# Patient Record
Sex: MALE
[De-identification: names, ages, dates, MRNs, and addresses within clinical notes are randomized per-mention and may not be internally consistent; named-entity substitution may affect disease eponyms.]

---

## 2022-07-03 ENCOUNTER — HOSPITAL ENCOUNTER (EMERGENCY)
Dept: HOSPITAL 97 - ER | Age: 37
Discharge: HOME | End: 2022-07-03
Payer: COMMERCIAL

## 2022-07-03 VITALS — OXYGEN SATURATION: 99 %

## 2022-07-03 VITALS — DIASTOLIC BLOOD PRESSURE: 92 MMHG | SYSTOLIC BLOOD PRESSURE: 138 MMHG

## 2022-07-03 VITALS — TEMPERATURE: 98.1 F

## 2022-07-03 DIAGNOSIS — S29.011A: Primary | ICD-10-CM

## 2022-07-03 PROCEDURE — 96372 THER/PROPH/DIAG INJ SC/IM: CPT

## 2022-07-03 PROCEDURE — 99283 EMERGENCY DEPT VISIT LOW MDM: CPT

## 2022-07-03 PROCEDURE — 71100 X-RAY EXAM RIBS UNI 2 VIEWS: CPT

## 2022-07-03 PROCEDURE — 72070 X-RAY EXAM THORAC SPINE 2VWS: CPT

## 2022-07-03 NOTE — RAD REPORT
EXAM DESCRIPTION:  RAD - Thoracic Spine Ap/Lat - 7/3/2022 8:59 pm

 

CLINICAL HISTORY:  PAIN

 

COMPARISON:  No comparisons

 

FINDINGS:  AP & lateral views of the thoracic spine were obtained.

 

Thoracic bodies are normal in height and alignment. There are no acute or destructive bony processes 
seen. No disc space narrowing. Endplate spurring changes are present.

 

No paraspinal masses are identified.

 

 

IMPRESSION:  Negative thoracic spine examination for acute finding.

## 2022-07-03 NOTE — ER
Nurse's Notes                                                                                     

 [facility]                                                                 

Name: Johnny Davila                                                                             

Age: 36 yrs                                                                                       

Sex: Male                                                                                         

: 1985                                                                                   

MRN: [id number]                                                                                   

Arrival Date: 2022                                                                          

Time: 19:26                                                                                       

Account#: [id number]                                                                            

Bed 16                                                                                            

Private MD:                                                                                       

Diagnosis: Strain of muscle and tendon of front wall of thorax                                    

                                                                                                  

Presentation:                                                                                     

                                                                                             

19:38 Chief complaint: Patient states: moving equipment out of trailer and heavy lifting 2    lp1 

      days ago, reports pain to left rib area radiating to abdomen; denies any trauma or          

      falls. Coronavirus screen: At this time, the client does not indicate any symptoms          

      associated with coronavirus-19. Ebola Screen: No symptoms or risks identified at this       

      time. Initial Sepsis Screen: Does the patient meet any 2 criteria? No. Patient's            

      initial sepsis screen is negative. Does the patient have a suspected source of              

      infection? No. Patient's initial sepsis screen is negative. Risk Assessment: Do you         

      want to hurt yourself or someone else? Patient reports no desire to harm self or            

      others. Onset of symptoms was 2022.                                                

19:38 Method Of Arrival: Ambulatory                                                           lp1 

19:38 Acuity: IVÁN 4                                                                           lp1 

                                                                                                  

Triage Assessment:                                                                                

20:25 General: Appears in no apparent distress. uncomfortable, Behavior is appropriate for    ke1 

      age. Pain: Complains of pain in left side ribs area Pain radiates to abdomen Pain           

      currently is 10 out of 10 on a pain scale. at worst was 10 out of 10 on a pain scale.       

      level that patient reports is acceptable is 5 out of 10 on a pain scale. Quality of         

      pain is described as aching, Pain began suddenly, Alleviated by repositioning. Neuro:       

      Level of Consciousness is awake, alert, Oriented to person, place, time, situation.         

      Respiratory: Respiratory effort is even, unlabored, Respiratory pattern is regular,         

      symmetrical. Musculoskeletal: Capillary refill < 3 seconds, Range of motion: intact in      

      all extremities.                                                                            

                                                                                                  

Historical:                                                                                       

- Allergies:                                                                                      

19:40 No Known Allergies;                                                                     lp1 

- Home Meds:                                                                                      

19:40 None [Active];                                                                          lp1 

- PMHx:                                                                                           

19:40 Depression; [place]iwether Dx;                                                              lp1 

- PSHx:                                                                                           

19:40 Carpel Tunnel;                                                                          lp1 

                                                                                                  

- Immunization history:: Adult Immunizations up to date.                                          

- Social history:: Smoking status: Patient denies any tobacco usage or history of.                

                                                                                                  

                                                                                                  

Screenin:25 Abuse screen: Denies threats or abuse. Nutritional screening: No deficits noted.        ke1 

      Tuberculosis screening: No symptoms or risk factors identified. Fall Risk No fall in        

      past 12 months (0 pts). No secondary diagnosis (0 pts). No IV (0 pts). Ambulatory Aid-      

      None/Bed Rest/Nurse Assist (0 pts). Gait- Normal/Bed Rest/Wheelchair (0 pts) Mental         

      Status- Oriented to own ability (0 pts). Total Jorge Fall Scale indicates No Risk (0-24     

      pts).                                                                                       

                                                                                                  

Assessment:                                                                                       

21:16 Reassessment: Patient and/or family updated on plan of care and expected duration. Pain ke1 

      level reassessed. Patient is alert, oriented x 3, equal unlabored respirations, skin        

      warm/dry/pink. Patient denies pain at this time. Patient states feeling better. Patient     

      states symptoms have improved. Neuro: Level of Consciousness is awake, alert, Oriented      

      to person, place, time, situation.                                                          

22:27 Reassessment: Patient and/or family updated on plan of care and expected duration. Pain ke1 

      level reassessed. Patient states feeling better. Patient states symptoms have improved.     

                                                                                                  

Vital Signs:                                                                                      

19:38  / 96; Pulse 74; Resp 18; Temp 98.1(O); Pulse Ox 100% on R/A; Weight 86.18 kg     lp1 

      (R); Height 5 ft. 6 in. (167.64 cm); Pain 10/10;                                            

21:17 Pain 7/10;                                                                              ke1 

21:17 Pain 7/10;                                                                              ke1 

21:19  / 85; Pulse 69; Resp 17; Pulse Ox 99% on R/A; Pain 7/10;                         ke1 

22:27  / 92; Pulse 68; Resp 17; Pulse Ox 99% on R/A;                                    ke1 

19:38 Body Mass Index 30.67 (86.18 kg, 167.64 cm)                                             lp1 

                                                                                                  

ED Course:                                                                                        

19:26 Patient arrived in ED.                                                                  ag3 

19:29 Maximus Blanco NP is PHCP.                                                           pm1 

19:29 Prakash Villalta MD is Attending Physician.                                             pm1 

19:38 Arm band placed on.                                                                     lp1 

19:39 Triage completed.                                                                       lp1 

19:49 Sachin Barclay RN is Primary Nurse.                                                 ke1 

20:27 Bed in low position. Call light in reach.                                               ke1 

21:01 Ribs Left XRAY In Process Unspecified.                                                  EDMS

21:01 XRAY Thoracic Spine (Ap/lat) In Process Unspecified.                                    EDMS

23:16 No provider procedures requiring assistance completed. Patient did not have IV access   ke1 

      during this emergency room visit.                                                           

                                                                                                  

Administered Medications:                                                                         

20:24 Drug: Ketorolac 60 mg Route: IM; Site: left deltoid;                                    ke1 

21:17 Follow up: Pain 7/10 Adult; Response: Pain is decreased                                 ke1 

20:25 Drug: Lidoderm Patch 5 % (700 mg/patch) 1 patches Route: Topical; Site: affected area;  ke1 

21:17 Follow up: Pain 7/10 Adult; Response: Pain is decreased                                 ke1 

                                                                                                  

                                                                                                  

Medication:                                                                                       

23:16 VIS not applicable for this client.                                                     ke1 

                                                                                                  

Outcome:                                                                                          

22:53 Discharge ordered by MD.                                                                pm1 

23:16 Discharged to home ambulatory.                                                          ke1 

23:16 Condition: good                                                                             

23:16 Discharge instructions given to patient.                                                    

23:16 Patient left the ED.                                                                    ke1 

                                                                                                  

Signatures:                                                                                       

Dispatcher [employer]                           EDMS                                                 

Josette Mehta, RN                         RN   lp1                                                  

Maximus Blanco, NP                    NP   pm1                                                  

Dulce Peters                                 ag3                                                  

Sachin Barclay RN                   RN   ke1                                                  

                                                                                                  

**************************************************************************************************

## 2022-07-03 NOTE — EDPHYS
Physician Documentation                                                                           

 [facility]                                                                 

Name: Johnny Davila                                                                             

Age: 36 yrs                                                                                       

Sex: Male                                                                                         

: 1985                                                                                   

MRN: [id number]                                                                                   

Arrival Date: 2022                                                                          

Time: 19:26                                                                                       

Account#: [id number]                                                                            

Bed 16                                                                                            

Private MD:                                                                                       

ED Physician Prakash Villalta                                                                      

HPI:                                                                                              

                                                                                             

20:06 This 36 yrs old Male presents to ER via Ambulatory with complaints of Back Pain.        pm1 

20:06 The patient presents with pain that is acute. The symptoms are located in the Left      pm1 

      lower anterior rib cage.                                                                    

20:06 Onset: The symptoms/episode began/occurred 2 day(s) ago. Associated signs and symptoms: pm1 

      Pertinent negatives: abdominal pain, dysuria, fever, headache, nausea, vomiting. The        

      problem was sustained lifting heavy object and twisting. Modifying factors: The patient     

      symptoms are alleviated by nothing, the patient symptoms are aggravated by bending,         

      movement. Severity of symptoms: in the emergency department the symptoms are unchanged.     

      The patient has not experienced similar symptoms in the past. The patient has not           

      recently seen a physician.                                                                  

                                                                                                  

Historical:                                                                                       

- Allergies:                                                                                      

19:40 No Known Allergies;                                                                     lp1 

- Home Meds:                                                                                      

19:40 None [Active];                                                                          lp1 

- PMHx:                                                                                           

19:40 Depression; [place] Dx;                                                              lp1 

- PSHx:                                                                                           

19:40 Carpel Tunnel;                                                                          lp1 

                                                                                                  

- Immunization history:: Adult Immunizations up to date.                                          

- Social history:: Smoking status: Patient denies any tobacco usage or history of.                

                                                                                                  

                                                                                                  

ROS:                                                                                              

20:06 Constitutional: Negative for fever, chills, and weight loss, Respiratory: Negative for  pm1 

      shortness of breath, cough, wheezing, and pleuritic chest pain, Abdomen/GI: Negative        

      for abdominal pain, nausea, vomiting, diarrhea, and constipation, Back: Negative for        

      injury and pain, MS/Extremity: Negative for injury and deformity, Skin: Negative for        

      injury, rash, and discoloration.                                                            

20:06 Neuro: Negative for headache, weakness, numbness, tingling, and seizure.                    

20:06 Cardiovascular: Positive for left lower rib cage pain, Negative for edema, palpitations.    

20:06 All other systems are negative.                                                             

                                                                                                  

Exam:                                                                                             

20:06 Constitutional:  This is a well developed, well nourished patient who is awake, alert,  pm1 

      and in no acute distress. Head/Face:  Normocephalic, atraumatic.                            

20:06 Back:  No spinal tenderness.  No costovertebral tenderness.  Full range of motion.          

      Skin:  Warm, dry with normal turgor.  Normal color with no rashes, no lesions, and no       

      evidence of cellulitis. MS/ Extremity:  Pulses equal, no cyanosis.  Neurovascular           

      intact.  Full, normal range of motion.                                                      

20:06 Chest/axilla: Inspection: normal, Palpation: crepitus, is not appreciated, tenderness,      

      that is mild, of the  left lower lateral anterior chest, that totally reproduces the        

      patient's complaints.                                                                       

20:06 Cardiovascular: Exam negative for  acute changes, Rate: normal, Rhythm: regular,            

      Pulses: no pulse deficits are appreciated, Heart sounds: normal, normal S1and S2.           

20:06 Respiratory: Exam negative for  acute changes, respiratory distress, shortness of           

      breath, Breath sounds: are clear throughout.                                                

20:06 Abdomen/GI: Exam negative for acute changes, Inspection: abdomen appears normal,            

      Palpation: abdomen is soft and non-tender, in all quadrants.                                

20:06 Neuro: Exam negative for acute changes, Orientation: is normal, Mentation: is normal,       

      Motor: is normal, moves all fours.                                                          

                                                                                                  

Vital Signs:                                                                                      

19:38  / 96; Pulse 74; Resp 18; Temp 98.1(O); Pulse Ox 100% on R/A; Weight 86.18 kg     lp1 

      (R); Height 5 ft. 6 in. (167.64 cm); Pain 10/10;                                            

21:17 Pain 7/10;                                                                              ke1 

21:17 Pain 7/10;                                                                              ke1 

21:19  / 85; Pulse 69; Resp 17; Pulse Ox 99% on R/A; Pain 7/10;                         ke1 

22:27  / 92; Pulse 68; Resp 17; Pulse Ox 99% on R/A;                                    ke1 

19:38 Body Mass Index 30.67 (86.18 kg, 167.64 cm)                                             lp1 

                                                                                                  

MDM:                                                                                              

19:33 Patient medically screened.                                                             [email or web address] 

22:53 Data reviewed: vital signs. Data interpreted: Pulse oximetry: on room air is 99 %.      pm1 

      Interpretation: normal. Counseling: I had a detailed discussion with the patient and/or     

      guardian regarding: the historical points, exam findings, and any diagnostic results        

      supporting the discharge/admit diagnosis, radiology results, the need for outpatient        

      follow up, to return to the emergency department if symptoms worsen or persist or if        

      there are any questions or concerns that arise at home.                                     

                                                                                                  

                                                                                             

20:04 Order name: Ribs Left XRAY; Complete Time: 22:52                                        pm1 

                                                                                             

20:04 Order name: XRAY Thoracic Spine (Ap/lat); Complete Time: 22:52                          pm1 

                                                                                                  

Administered Medications:                                                                         

20:24 Drug: Ketorolac 60 mg Route: IM; Site: left deltoid;                                    ke1 

21:17 Follow up: Pain 7/10 Adult; Response: Pain is decreased                                 ke1 

20:25 Drug: Lidoderm Patch 5 % (700 mg/patch) 1 patches Route: Topical; Site: affected area;  ke1 

21:17 Follow up: Pain 710 Adult; Response: Pain is decreased                                 ke1 

                                                                                                  

                                                                                                  

Disposition Summary:                                                                              

22 22:53                                                                                    

Discharge Ordered                                                                                 

      Location: Home                                                                          pm1 

      Problem: new                                                                            pm1 

      Symptoms: have improved                                                                 pm1 

      Condition: Stable                                                                       pm1 

      Diagnosis                                                                                   

        - Strain of muscle and tendon of front wall of thorax                                 pm1 

      Followup:                                                                               pm1 

        - With: Emergency Department                                                               

        - When: As needed                                                                          

        - Reason: Worsening of condition                                                           

      Followup:                                                                               pm1 

        - With: Private Physician                                                                  

        - When: 2 - 3 days                                                                         

        - Reason: Recheck today's complaints, Continuance of care, Re-evaluation by your           

      physician                                                                                   

      Discharge Instructions:                                                                     

        - Discharge Summary Sheet                                                             pm1 

        - Muscle Strain                                                                       pm1 

      Forms:                                                                                      

        - Medication Reconciliation Form                                                      pm1 

        - Thank You Letter                                                                    pm1 

        - Antibiotic Education                                                                pm1 

        - Prescription Opioid Use                                                             pm1 

      Prescriptions:                                                                              

        - Cyclobenzaprine 10 mg Oral Tablet                                                        

            - take 1 tablet by ORAL route every 8 hours As needed; 30 tablet; Refills: 0,     pm1 

      Product Selection Permitted                                                                 

        - Diclofenac Sodium 75 mg Oral tablet,delayed release (DR/EC)                              

            - take 1 tablet by ORAL route 2 times per day As needed; 30 tablet; Refills: 0,   pm1 

      Product Selection Permitted                                                                 

        - Lidoderm 5 % Topical adhesive patch,medicated                                            

            - apply 1 patch by TRANSDERMAL route once daily As needed 12 hours on and 12      pm1 

      hours off in a 24 hour period; 10 patch; Refills: 0, Product Selection Permitted            

Signatures:                                                                                       

Dispatcher [employer]                           Prakash Gamble MD MD cha Pena, Laura RN                         RN   lp1                                                  

Maximus Blanco NP                    NP   pm1                                                  

Sachin Barclay RN                   RN   ke1                                                  

                                                                                                  

**************************************************************************************************

## 2022-07-03 NOTE — RAD REPORT
EXAM DESCRIPTION:  RAD - Ribs  Left - 7/3/2022 8:59 pm

 

CLINICAL HISTORY:  Lifting injury, left-sided rib pain

 

COMPARISON:  None.

 

FINDINGS:  No displaced rib fracture is seen and no non-displaced rib fractures suspected. No aggress
mukund rib lesion. No underlying pneumothorax, effusion, infiltrate or pulmonary contusion.

 

IMPRESSION:  Negative left rib series.

## 2022-12-25 ENCOUNTER — HOSPITAL ENCOUNTER (EMERGENCY)
Dept: HOSPITAL 97 - ER | Age: 37
Discharge: HOME | End: 2022-12-25
Payer: COMMERCIAL

## 2022-12-25 VITALS — SYSTOLIC BLOOD PRESSURE: 118 MMHG | DIASTOLIC BLOOD PRESSURE: 79 MMHG

## 2022-12-25 VITALS — TEMPERATURE: 99.1 F | OXYGEN SATURATION: 100 %

## 2022-12-25 DIAGNOSIS — Z20.822: ICD-10-CM

## 2022-12-25 DIAGNOSIS — J06.9: Primary | ICD-10-CM

## 2022-12-25 LAB — SARS-COV-2 RNA RESP QL NAA+PROBE: NEGATIVE

## 2022-12-25 PROCEDURE — 99281 EMR DPT VST MAYX REQ PHY/QHP: CPT

## 2022-12-25 PROCEDURE — 0240U: CPT

## 2022-12-25 NOTE — ER
Nurse's Notes                                                                                     

 [facility]                                                                 

Name: Johnny Davila                                                                             

Age: 37 yrs                                                                                       

Sex: Male                                                                                         

: 1985                                                                                   

MRN: [id number]                                                                                   

Arrival Date: 2022                                                                          

Time: 14:30                                                                                       

Account#: [id number]                                                                            

Bed 12                                                                                            

Private MD:                                                                                       

Diagnosis: Acute upper respiratory infection, unspecified                                         

                                                                                                  

Presentation:                                                                                     

                                                                                             

14:49 Chief complaint: Headache, body aches, cough, congestion, and chills since this         hb  

      morning. Coronavirus screen: Client presents with at least one sign or symptom that may     

      indicate coronavirus-19. Standard/surgical mask placed on the client. Provider              

      contacted for isolation considerations. Ebola Screen: No symptoms or risks identified       

      at this time. Risk Assessment: Do you want to hurt yourself or someone else? Patient        

      reports no desire to harm self or others. Onset of symptoms was 2022.          

14:49 Method Of Arrival: Ambulatory                                                             

14:49 Acuity: IVÁN 4                                                                           hb  

14:50 Initial Sepsis Screen: Does the patient meet any 2 criteria? No. Patient's initial      hb  

      sepsis screen is negative. Does the patient have a suspected source of infection? No.       

      Patient's initial sepsis screen is negative.                                                

                                                                                                  

Triage Assessment:                                                                                

14:50 General: Appears in no apparent distress. Behavior is calm, cooperative. Pain: Pain     hb  

      currently is 10 out of 10 on a pain scale. EENT: Reports sinus congestion. Neuro: Level     

      of Consciousness is awake, alert, obeys commands, Oriented to person, place, time,          

      situation, Reports headache. Cardiovascular: Patient's skin is warm and dry.                

      Respiratory: Respiratory effort is even, unlabored, Respiratory pattern is regular,         

      symmetrical. GI: No signs and/or symptoms were reported involving the gastrointestinal      

      system. : No signs and/or symptoms were reported regarding the genitourinary system.      

      Derm: Skin is pink, warm \T\ dry. Musculoskeletal: Reports body aches.                      

                                                                                                  

Historical:                                                                                       

- Allergies:                                                                                      

14:50 No Known Allergies;                                                                     hb  

- PMHx:                                                                                           

14:50 [place] Dx; Depression;                                                              hb  

- PSHx:                                                                                           

14:50 Carpel Tunnel;                                                                          hb  

                                                                                                  

- Immunization history:: Adult Immunizations up to date.                                          

- Social history:: Smoking status: Patient denies any tobacco usage or history of.                

                                                                                                  

                                                                                                  

Screening:                                                                                        

15:20 [facility] ED Fall Risk Assessment (Adult) Score/Fall Risk Level 0 - 2 = Low Risk         hb  

      Oriented to surroundings, Maintained a safe environment. Abuse screen: Denies threats       

      or abuse. Denies injuries from another. Nutritional screening: No deficits noted.           

      Tuberculosis screening: No symptoms or risk factors identified.                             

                                                                                                  

Assessment:                                                                                       

15:20 General: See triage assessment .                                                        hb  

17:08 Reassessment: Patient is alert, oriented x 3, equal unlabored respirations, skin        aa5 

      warm/dry/pink.                                                                              

                                                                                                  

Vital Signs:                                                                                      

14:50  / 79; Pulse 120; Resp 18; Temp 98.4(TE); Pulse Ox 98% ; Weight 86.18 kg; Height  hb  

      5 ft. 6 in. (167.64 cm); Pain 10/10;                                                        

17:08 Pulse 100; Resp 19 S; Temp 99.1(O); Pulse Ox 100% on R/A;                               aa5 

14:50 Body Mass Index 30.67 (86.18 kg, 167.64 cm)                                             hb  

                                                                                                  

ED Course:                                                                                        

14:30 Patient arrived in ED.                                                                  mr  

14:32 [facility]                                                        kb  

14:32 Prakash Villalta MD is Attending Physician.                                             kb  

14:50 Triage completed.                                                                       hb  

14:50 Arm band placed on.                                                                     hb  

15:20 Patient has correct armband on for positive identification.                             hb  

15:27 Freya Tavares, RN is Primary Nurse.                                                   hb  

17:09 No provider procedures requiring assistance completed. Patient did not have IV access   aa5 

      during this emergency room visit.                                                           

                                                                                                  

Administered Medications:                                                                         

No medications were administered                                                                  

                                                                                                  

                                                                                                  

Medication:                                                                                       

15:20 VIS not applicable for this client.                                                     hb  

                                                                                                  

Outcome:                                                                                          

16:56 Discharge ordered by MD.                                                                kb  

17:09 Discharged to home ambulatory.                                                          aa5 

17:09 Condition: good                                                                             

17:09 Discharge instructions given to patient, Instructed on discharge instructions, follow       

      up and referral plans. Demonstrated understanding of instructions, follow-up care.          

17:09 Patient left the ED.                                                                    aa5 

                                                                                                  

Signatures:                                                                                       

Diana Murguia FNP-C FNP-Ckb                                                   

Siri DickersonRamona RN                     RN   aa5                                                  

Freya Tavares, KYAW                     RN   hb                                                   

                                                                                                  

**************************************************************************************************